# Patient Record
Sex: FEMALE | Race: WHITE
[De-identification: names, ages, dates, MRNs, and addresses within clinical notes are randomized per-mention and may not be internally consistent; named-entity substitution may affect disease eponyms.]

---

## 2017-01-04 ENCOUNTER — HOSPITAL ENCOUNTER (EMERGENCY)
Dept: HOSPITAL 10 - E/R | Age: 32
Discharge: HOME | End: 2017-01-04
Payer: COMMERCIAL

## 2017-01-04 VITALS
BODY MASS INDEX: 31.64 KG/M2 | HEIGHT: 63 IN | WEIGHT: 178.57 LBS | HEIGHT: 63 IN | WEIGHT: 178.57 LBS | BODY MASS INDEX: 31.64 KG/M2

## 2017-01-04 VITALS — DIASTOLIC BLOOD PRESSURE: 94 MMHG | SYSTOLIC BLOOD PRESSURE: 128 MMHG | RESPIRATION RATE: 18 BRPM | HEART RATE: 79 BPM

## 2017-01-04 DIAGNOSIS — R10.11: Primary | ICD-10-CM

## 2017-01-04 DIAGNOSIS — K80.20: ICD-10-CM

## 2017-01-04 DIAGNOSIS — R11.0: ICD-10-CM

## 2017-01-04 LAB
ADD UMIC: YES
ALBUMIN SERPL-MCNC: 4.1 G/DL (ref 3.3–4.9)
ALBUMIN/GLOB SERPL: 1.17 {RATIO}
ALP SERPL-CCNC: 71 IU/L (ref 42–121)
ALT SERPL-CCNC: 30 IU/L (ref 13–69)
ANION GAP SERPL CALC-SCNC: 19 MMOL/L (ref 8–16)
AST SERPL-CCNC: 23 IU/L (ref 15–46)
BASOPHILS # BLD AUTO: 0 10^3/UL (ref 0–0.1)
BASOPHILS NFR BLD: 0.4 % (ref 0–2)
BILIRUB DIRECT SERPL-MCNC: 0 MG/DL (ref 0–0.2)
BILIRUB SERPL-MCNC: 0.1 MG/DL (ref 0.2–1.3)
BUN SERPL-MCNC: 14 MG/DL (ref 7–20)
CALCIUM SERPL-MCNC: 9.5 MG/DL (ref 8.4–10.2)
CHLORIDE SERPL-SCNC: 102 MMOL/L (ref 97–110)
CO2 SERPL-SCNC: 27 MMOL/L (ref 21–31)
COLOR UR: (no result)
CONDITION: 1
CREAT SERPL-MCNC: 0.65 MG/DL (ref 0.44–1)
EOSINOPHIL # BLD: 0.1 10^3/UL (ref 0–0.5)
EOSINOPHIL NFR BLD: 1.1 % (ref 0–7)
ERYTHROCYTE [DISTWIDTH] IN BLOOD BY AUTOMATED COUNT: 12.3 % (ref 11.5–14.5)
GLOBULIN SER-MCNC: 3.5 G/DL (ref 1.3–3.2)
GLUCOSE SERPL-MCNC: 95 MG/DL (ref 70–220)
GLUCOSE UR STRIP-MCNC: NEGATIVE %
HCT VFR BLD CALC: 39.9 % (ref 37–47)
HGB BLD-MCNC: 13.6 G/DL (ref 12–16)
KETONES UR STRIP.AUTO-MCNC: (no result) MG/DL
LYMPHOCYTES # BLD AUTO: 2.1 10^3/UL (ref 0.8–2.9)
LYMPHOCYTES NFR BLD AUTO: 19.1 % (ref 15–51)
MCH RBC QN AUTO: 28.8 PG (ref 29–33)
MCHC RBC AUTO-ENTMCNC: 34 G/DL (ref 32–37)
MCV RBC AUTO: 84.7 FL (ref 82–101)
MONOCYTES # BLD: 0.7 10^3/UL (ref 0.3–0.9)
MONOCYTES NFR BLD: 6.6 % (ref 0–11)
NEUTROPHILS # BLD: 7.9 10^3/UL (ref 1.6–7.5)
NEUTROPHILS NFR BLD AUTO: 72.8 % (ref 39–77)
NITRITE UR QL STRIP.AUTO: NEGATIVE
NRBC # BLD MANUAL: 0 10^3/UL (ref 0–0)
NRBC BLD QL: 0 /100WBC (ref 0–0)
PLATELET # BLD: 291 10^3/UL (ref 140–440)
PMV BLD AUTO: 8.5 FL (ref 7.4–10.4)
POTASSIUM SERPL-SCNC: 4.2 MMOL/L (ref 3.5–5.1)
PROT SERPL-MCNC: 7.6 G/DL (ref 6.1–8.1)
RBC # BLD AUTO: 4.71 10^6/UL (ref 4.2–5.4)
RBC # UR AUTO: (no result) /UL
RBC #/AREA URNS HPF: (no result) /HPF
SODIUM SERPL-SCNC: 144 MMOL/L (ref 135–144)
SQUAMOUS #/AREA URNS HPF: (no result) /[HPF]
URINE BILIRUBIN (DIP): NEGATIVE
URINE TOTAL PROTEIN (DIP): NEGATIVE
UROBILINOGEN UR STRIP-ACNC: (no result) (ref 0.1–1)
WBC # BLD AUTO: 10.8 10^3/UL (ref 4.8–10.8)
WBC # BLD: 10.8 10^3/UL (ref 4.8–10.8)
WBC # UR STRIP: (no result) /UL

## 2017-01-04 PROCEDURE — 96374 THER/PROPH/DIAG INJ IV PUSH: CPT

## 2017-01-04 PROCEDURE — 80053 COMPREHEN METABOLIC PANEL: CPT

## 2017-01-04 PROCEDURE — 83690 ASSAY OF LIPASE: CPT

## 2017-01-04 PROCEDURE — 96375 TX/PRO/DX INJ NEW DRUG ADDON: CPT

## 2017-01-04 PROCEDURE — 81001 URINALYSIS AUTO W/SCOPE: CPT

## 2017-01-04 PROCEDURE — 85025 COMPLETE CBC W/AUTO DIFF WBC: CPT

## 2017-01-04 PROCEDURE — 76705 ECHO EXAM OF ABDOMEN: CPT

## 2017-01-04 PROCEDURE — 81003 URINALYSIS AUTO W/O SCOPE: CPT

## 2017-01-04 PROCEDURE — 36415 COLL VENOUS BLD VENIPUNCTURE: CPT

## 2017-01-04 NOTE — ERD
ER Documentation


Chief Complaint


Date/Time


DATE: 1/4/17 


TIME: 05:44


Chief Complaint


right side abd pain since 0130, same as previous GB pain. -n/v





HPI


This a very pleasant 31 year female comes right-sided abdominal pain since 131.

  She has history of gallstones and says is similar to previous pain.  Pain is 

mild to moderate intensity.  Mild nausea no vomiting.  Pain is mild to moderate 

in intensity with no exacerbating or relieving factors.





ROS


All systems reviewed and are negative except as per history of present illness.





Medications


Home Meds


Active Scripts


Hydrocodone/Acetaminophen (Norco  Tablet) 1 Each Tablet, 1 EACH PO Q6, #

20 TAB


   Prov:BILLIE VELEZ PA-C         12/8/16


Ibuprofen* (Motrin*) 800 Mg Tab, 800 MG PO Q6H Y for PAIN AND OR ELEVATED TEMP, 

#30 TAB


   Prov:BILLIE VELEZ PA-C         12/8/16


Reported Medications


[No Meds]   No Conflict Check


   6/1/16





Allergies


Allergies:  


Coded Allergies:  


     No Known Allergy (Unverified , 12/8/16)





PMhx/Soc


History of Surgery:  Yes (NOSE)


Anesthesia Reaction:  No


Hx Neurological Disorder:  No


Hx Respiratory Disorders:  No


Hx Cardiac Disorders:  No


Hx Psychiatric Problems:  No


Hx Miscellaneous Medical Probl:  No


Hx Alcohol Use:  Yes (OCCASIONAL)


Hx Substance Use:  No


Hx Tobacco Use:  No


Smoking Status:  Never smoker





Physical Exam


Vitals





Vital Signs








  Date Time  Temp Pulse Resp B/P Pulse Ox O2 Delivery O2 Flow Rate FiO2


 


1/4/17 03:58 97.6 64 18 136/87 99   








Physical Exam


Const:  []


Head:   Atraumatic 


Eyes:    Normal Conjunctiva


ENT:    Normal External Ears, Nose and Mouth.


Neck:               Full range of motion..~ No meningismus.


Resp:    Clear to auscultation bilaterally


Cardio:    Regular rate and rhythm, no murmurs


Abd:    Soft, non tender, non distended. Normal bowel sounds


Skin:    No petechiae or rashes


Back:    No midline or flank tenderness


Ext:    No cyanosis, or edema


Neur:    Awake and alert


Psych:    Normal Mood and Affect


Result Diagram:  


1/4/17 0450                                                                    

            1/4/17 0450





Results 24 hrs





 Laboratory Tests








Test


  1/4/17


04:50


 


Alanine Aminotransferase


(ALT/SGPT) 30IU/L 


 


 


Albumin 4.1g/dl 


 


Albumin/Globulin Ratio 1.17 


 


Alkaline Phosphatase 71IU/L 


 


Anion Gap 19 


 


Aspartate Amino Transf


(AST/SGOT) 23IU/L 


 


 


Basophils # 0.010^3/ul 


 


Basophils % 0.4% 


 


Blood Morphology Comment  


 


Blood Urea Nitrogen 14mg/dl 


 


Calcium Level 9.5mg/dl 


 


Carbon Dioxide Level 27mmol/L 


 


Chloride Level 102mmol/L 


 


Creatinine 0.65mg/dl 


 


Direct Bilirubin 0.00mg/dl 


 


Eosinophils # 0.110^3/ul 


 


Eosinophils % 1.1% 


 


Globulin 3.50g/dl 


 


Glucose Level 95mg/dl 


 


Hematocrit 39.9% 


 


Hemoglobin 13.6g/dl 


 


Indirect Bilirubin 0.1mg/dl 


 


Lipase 105U/L 


 


Lymphocytes # 2.110^3/ul 


 


Lymphocytes % 19.1% 


 


Mean Corpuscular Hemoglobin 28.8pg 


 


Mean Corpuscular Hemoglobin


Concent 34.0g/dl 


 


 


Mean Corpuscular Volume 84.7fl 


 


Mean Platelet Volume 8.5fl 


 


Monocytes # 0.710^3/ul 


 


Monocytes % 6.6% 


 


Neutrophils # 7.910^3/ul 


 


Neutrophils % 72.8% 


 


Nucleated Red Blood Cells # 0.010^3/ul 


 


Nucleated Red Blood Cells % 0.0/100WBC 


 


Platelet Count 52125^3/UL 


 


Potassium Level 4.2mmol/L 


 


Red Blood Count 4.7110^6/ul 


 


Red Cell Distribution Width 12.3% 


 


Sodium Level 144mmol/L 


 


Total Bilirubin 0.1mg/dl 


 


Total Protein 7.6g/dl 


 


White Blood Count 10.810^3/ul 








 Current Medications








 Medications


  (Trade)  Dose


 Ordered  Sig/Parker


 Route


 PRN Reason  Start Time


 Stop Time Status Last Admin


Dose Admin


 


 Sodium Chloride


  (NS)  1,000 ml @ 


 1,000 mls/hr  Q1H STAT


 IV


   1/4/17 04:44


 1/4/17 05:43 DC 1/4/17 04:56


 


 


 Morphine Sulfate


  (morphine)  4 mg  ONCE  STAT


 IV


   1/4/17 04:44


 1/4/17 04:45 DC 1/4/17 04:56


 


 


 Ondansetron HCl


  (Zofran Inj)  4 mg  ONCE  STAT


 IV


   1/4/17 04:44


 1/4/17 04:46 DC 1/4/17 04:56


 











Procedures/MDM


CBC:      no e/o of systemic infection or severe anemia


CMP:      no e/o severe acidosis, alkalosis, renal failure, diabetic 

ketoacidosis, liver disease


Lipase:               no e/o pancreatitis


PT/INR:   normal coagulation


Urine:      no e/o acute infection or hematuria





Ultrasound shows cholelithiasis.  Please see radiologist full dictation for 

report.





Medical decision-making: This 22 female as well as a gallstone pain.  At this 

point is clinically stable.  To be discharged home with tramadol and Zofran.  

His return 8 hours for serial abdominal exams which he agrees.





Departure


Diagnosis:  


 Primary Impression:  


 Abdominal pain


 Abdominal location:  right upper quadrant  Qualified Code:  R10.11 - Right 

upper quadrant abdominal pain


 Additional Impression:  


 Gallstones


Condition:  Serious











ZEB ODOM Jan 4, 2017 05:46

## 2017-01-04 NOTE — RADRPT
PROCEDURE:   US Abdomen (right upper quadrant). 

 

CLINICAL INDICATION:    Abdominal pain.

 

TECHNIQUE:   Multiple real-time longitudinal and transverse images of the right upper quadrant of th
e abdomen were acquired utilizing a curved array transducer. Images were reviewed on a high-resoluti
on PACS workstation. 

 

COMPARISON:   None 

 

FINDINGS:

 

The liver is normal in size and demonstrates normal  echogenicity.  No focal intrahepatic mass is id
entified.  The gallbladder contains stones.  There is no pericholecystic fluid or gallbladder wall t
hickening. No intra or extrahepatic biliary dilatation is seen.  The common bile duct measures 4.4 m
m in maximal dimension. The portal and hepatic veins are patent demonstrating normal directional annamaria
w. The visualized portions of the pancreas are unremarkable with obscuration of the tail of the panc
reas.  No free fluid is identified. 

 

The right kidney measures 12.6 cm in length.  There is normal echogenicity within the right kidney. 
 There is no perinephric fluid collection.  No hydronephrosis, mass, or calculus is seen.   

 

IMPRESSION:

Cholelithiasis. 

 

  

RPTAT: HH

_____________________________________________ 

.Gabriela Sanchez MD, MD           Date    Time 

Electronically viewed and signed by .Gabriela Sanchez MD, MD on 01/04/2017 05:40 

 

D:  01/04/2017 05:40  T:  01/04/2017 05:40

.G/

## 2017-01-13 ENCOUNTER — HOSPITAL ENCOUNTER (OUTPATIENT)
Dept: HOSPITAL 10 - LAB | Age: 32
Discharge: HOME | End: 2017-01-13
Attending: INTERNAL MEDICINE
Payer: COMMERCIAL

## 2017-01-13 DIAGNOSIS — K80.20: ICD-10-CM

## 2017-01-13 DIAGNOSIS — D64.9: Primary | ICD-10-CM

## 2017-01-13 LAB
ALBUMIN SERPL-MCNC: 4.2 G/DL (ref 3.3–4.9)
ALBUMIN/GLOB SERPL: 1.13 {RATIO}
ALP SERPL-CCNC: 75 IU/L (ref 42–121)
ALT SERPL-CCNC: 18 IU/L (ref 13–69)
ANION GAP SERPL CALC-SCNC: 19 MMOL/L (ref 8–16)
APTT BLD: 30.2 SEC (ref 25–35)
AST SERPL-CCNC: 21 IU/L (ref 15–46)
BASOPHILS # BLD AUTO: 0.1 10^3/UL (ref 0–0.1)
BASOPHILS NFR BLD: 0.6 % (ref 0–2)
BILIRUB DIRECT SERPL-MCNC: 0 MG/DL (ref 0–0.2)
BILIRUB SERPL-MCNC: 0.2 MG/DL (ref 0.2–1.3)
BUN SERPL-MCNC: 12 MG/DL (ref 7–20)
CALCIUM SERPL-MCNC: 9.5 MG/DL (ref 8.4–10.2)
CHLORIDE SERPL-SCNC: 103 MMOL/L (ref 97–110)
CO2 SERPL-SCNC: 28 MMOL/L (ref 21–31)
CONDITION: 1
CREAT SERPL-MCNC: 0.59 MG/DL (ref 0.44–1)
EOSINOPHIL # BLD: 0.2 10^3/UL (ref 0–0.5)
EOSINOPHIL NFR BLD: 1.9 % (ref 0–7)
ERYTHROCYTE [DISTWIDTH] IN BLOOD BY AUTOMATED COUNT: 12.6 % (ref 11.5–14.5)
GLOBULIN SER-MCNC: 3.7 G/DL (ref 1.3–3.2)
GLUCOSE SERPL-MCNC: 89 MG/DL (ref 70–220)
HCT VFR BLD CALC: 42.2 % (ref 37–47)
HGB BLD-MCNC: 14.6 G/DL (ref 12–16)
INR PPP: 0.91
LYMPHOCYTES # BLD AUTO: 2.6 10^3/UL (ref 0.8–2.9)
LYMPHOCYTES NFR BLD AUTO: 25.2 % (ref 15–51)
MCH RBC QN AUTO: 29.1 PG (ref 29–33)
MCHC RBC AUTO-ENTMCNC: 34.6 G/DL (ref 32–37)
MCV RBC AUTO: 84.1 FL (ref 82–101)
MONOCYTES # BLD: 0.7 10^3/UL (ref 0.3–0.9)
MONOCYTES NFR BLD: 6.6 % (ref 0–11)
NEUTROPHILS # BLD: 6.8 10^3/UL (ref 1.6–7.5)
NEUTROPHILS NFR BLD AUTO: 65.7 % (ref 39–77)
NRBC # BLD MANUAL: 0 10^3/UL (ref 0–0)
NRBC BLD QL: 0 /100WBC (ref 0–0)
PLATELET # BLD: 274 10^3/UL (ref 140–440)
PMV BLD AUTO: 8.2 FL (ref 7.4–10.4)
POTASSIUM SERPL-SCNC: 4.2 MMOL/L (ref 3.5–5.1)
PROT SERPL-MCNC: 7.9 G/DL (ref 6.1–8.1)
PROTHROMBIN TIME: 12.3 SEC (ref 12.2–14.2)
PT RATIO: 1
RBC # BLD AUTO: 5.02 10^6/UL (ref 4.2–5.4)
SODIUM SERPL-SCNC: 146 MMOL/L (ref 135–144)
WBC # BLD AUTO: 10.4 10^3/UL (ref 4.8–10.8)
WBC # BLD: 10.4 10^3/UL (ref 4.8–10.8)

## 2017-01-13 PROCEDURE — 85730 THROMBOPLASTIN TIME PARTIAL: CPT

## 2017-01-13 PROCEDURE — 85610 PROTHROMBIN TIME: CPT

## 2017-01-13 PROCEDURE — 80053 COMPREHEN METABOLIC PANEL: CPT

## 2017-01-13 PROCEDURE — 85025 COMPLETE CBC W/AUTO DIFF WBC: CPT

## 2017-01-16 ENCOUNTER — OFFICE (OUTPATIENT)
Dept: URBAN - METROPOLITAN AREA CLINIC 56 | Facility: CLINIC | Age: 32
End: 2017-01-16

## 2017-01-16 VITALS
HEART RATE: 79 BPM | WEIGHT: 175 LBS | HEIGHT: 64 IN | SYSTOLIC BLOOD PRESSURE: 113 MMHG | DIASTOLIC BLOOD PRESSURE: 67 MMHG

## 2017-01-16 DIAGNOSIS — R10.11 RUQ PAIN: ICD-10-CM

## 2017-01-16 PROCEDURE — 99212 OFFICE O/P EST SF 10 MIN: CPT

## 2017-01-16 NOTE — SERVICEHPINOTES
CURTIS PARKS   returns today for follow-up from last visit on   5/25/2016  . BRpt went to er in dec 2016 for abd pain  us showed  gallstonesBRwent to er again on jan 4th  17cmp normal

## 2017-04-26 ENCOUNTER — HOSPITAL ENCOUNTER (OUTPATIENT)
Dept: HOSPITAL 10 - LAB | Age: 32
Discharge: HOME | End: 2017-04-26
Attending: INTERNAL MEDICINE
Payer: COMMERCIAL

## 2017-04-26 DIAGNOSIS — E55.9: Primary | ICD-10-CM

## 2017-04-26 DIAGNOSIS — R73.03: ICD-10-CM

## 2017-04-26 DIAGNOSIS — E03.9: ICD-10-CM

## 2017-04-26 LAB
ADD SCAN DIFF: NO
ALBUMIN SERPL-MCNC: 4.2 G/DL (ref 3.3–4.9)
ALBUMIN/GLOB SERPL: 1.2 {RATIO}
ALP SERPL-CCNC: 85 IU/L (ref 42–121)
ALT SERPL-CCNC: 24 IU/L (ref 13–69)
ANION GAP SERPL CALC-SCNC: 14 MMOL/L (ref 8–16)
AST SERPL-CCNC: 21 IU/L (ref 15–46)
BASOPHILS # BLD AUTO: 0.1 10^3/UL (ref 0–0.1)
BASOPHILS NFR BLD: 0.9 % (ref 0–2)
BILIRUB DIRECT SERPL-MCNC: 0 MG/DL (ref 0–0.2)
BILIRUB SERPL-MCNC: 0.1 MG/DL (ref 0.2–1.3)
BUN SERPL-MCNC: 16 MG/DL (ref 7–20)
CALCIUM SERPL-MCNC: 9.2 MG/DL (ref 8.4–10.2)
CHLORIDE SERPL-SCNC: 107 MMOL/L (ref 97–110)
CHOLEST SERPL-MCNC: 157 MG/DL (ref 100–200)
CHOLEST/HDLC SERPL: 3.2 RATIO
CO2 SERPL-SCNC: 25 MMOL/L (ref 21–31)
CREAT SERPL-MCNC: 0.66 MG/DL (ref 0.44–1)
EOSINOPHIL # BLD: 0.1 10^3/UL (ref 0–0.5)
EOSINOPHIL NFR BLD: 1.2 % (ref 0–7)
ERYTHROCYTE [DISTWIDTH] IN BLOOD BY AUTOMATED COUNT: 12 % (ref 11.5–14.5)
GLOBULIN SER-MCNC: 3.5 G/DL (ref 1.3–3.2)
GLUCOSE SERPL-MCNC: 87 MG/DL (ref 70–220)
HCT VFR BLD CALC: 42.2 % (ref 37–47)
HDLC SERPL-MCNC: 49 MG/DL (ref 34–82)
HGB BLD-MCNC: 13.6 G/DL (ref 12–16)
LYMPHOCYTES # BLD AUTO: 2.5 10^3/UL (ref 0.8–2.9)
LYMPHOCYTES NFR BLD AUTO: 27.9 % (ref 15–51)
MCH RBC QN AUTO: 28.2 PG (ref 29–33)
MCHC RBC AUTO-ENTMCNC: 32.2 G/DL (ref 32–37)
MCV RBC AUTO: 87.6 FL (ref 82–101)
MONOCYTES # BLD: 0.6 10^3/UL (ref 0.3–0.9)
MONOCYTES NFR BLD: 6.2 % (ref 0–11)
NEUTROPHILS # BLD: 5.6 10^3/UL (ref 1.6–7.5)
NEUTROPHILS NFR BLD AUTO: 63.3 % (ref 39–77)
NRBC # BLD MANUAL: 0 10^3/UL (ref 0–0)
NRBC BLD QL: 0 /100WBC (ref 0–0)
PLATELET # BLD: 309 10^3/UL (ref 140–415)
PMV BLD AUTO: 9.9 FL (ref 7.4–10.4)
POTASSIUM SERPL-SCNC: 4.2 MMOL/L (ref 3.5–5.1)
PROT SERPL-MCNC: 7.7 G/DL (ref 6.1–8.1)
RBC # BLD AUTO: 4.82 10^6/UL (ref 4.2–5.4)
SODIUM SERPL-SCNC: 142 MMOL/L (ref 135–144)
TRIGL SERPL-MCNC: 62 MG/DL (ref 0–149)
TSH SERPL-ACNC: 0.73 MIU/L (ref 0.47–4.68)
WBC # BLD AUTO: 8.8 10^3/UL (ref 4.8–10.8)

## 2017-04-26 PROCEDURE — 84443 ASSAY THYROID STIM HORMONE: CPT

## 2017-04-26 PROCEDURE — 80053 COMPREHEN METABOLIC PANEL: CPT

## 2017-04-26 PROCEDURE — 83036 HEMOGLOBIN GLYCOSYLATED A1C: CPT

## 2017-04-26 PROCEDURE — 82306 VITAMIN D 25 HYDROXY: CPT

## 2017-04-26 PROCEDURE — 85025 COMPLETE CBC W/AUTO DIFF WBC: CPT

## 2017-04-26 PROCEDURE — 84436 ASSAY OF TOTAL THYROXINE: CPT

## 2017-04-26 PROCEDURE — 80061 LIPID PANEL: CPT

## 2017-05-09 ENCOUNTER — HOSPITAL ENCOUNTER (EMERGENCY)
Dept: HOSPITAL 10 - FTE | Age: 32
Discharge: HOME | End: 2017-05-09
Payer: COMMERCIAL

## 2017-05-09 VITALS — WEIGHT: 158.51 LBS | BODY MASS INDEX: 36.68 KG/M2 | HEIGHT: 55 IN

## 2017-05-09 DIAGNOSIS — M54.2: ICD-10-CM

## 2017-05-09 DIAGNOSIS — M79.602: ICD-10-CM

## 2017-05-09 DIAGNOSIS — M54.9: Primary | ICD-10-CM

## 2017-05-09 DIAGNOSIS — Z04.1: ICD-10-CM

## 2017-05-09 PROCEDURE — 72100 X-RAY EXAM L-S SPINE 2/3 VWS: CPT

## 2017-05-09 PROCEDURE — 73030 X-RAY EXAM OF SHOULDER: CPT

## 2017-05-09 PROCEDURE — 73060 X-RAY EXAM OF HUMERUS: CPT

## 2017-05-09 PROCEDURE — 72040 X-RAY EXAM NECK SPINE 2-3 VW: CPT
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
## 2017-05-09 NOTE — RADRPT
PROCEDURE:   XR Cervical Spine. 

 

CLINICAL INDICATION:   Neck pain 

 

TECHNIQUE:   AP, lateral and odontoid views of the cervical spine were performed. The images were re
viewed on a PACS workstation. 

 

COMPARISON:   None. 

 

FINDINGS:

Cervical spine is visualized to the level of C7-T1.  No displaced fracture is identified.  The align
ment of the cervical spine is anatomic.  Vertebral body heights and disk spaces are well maintained.
  Facets are normally aligned. The lateral masses are symmetric. The prevertebral soft tissues are u
nremarkable.

 

IMPRESSION:

 

1.  No acute fracture dislocation.

2.  No significant degenerate change.

 

 

RPTAT: HH

_____________________________________________ 

.Vik Monique MD, MD           Date    Time 

Electronically viewed and signed by .Vik Monique MD, MD on 05/09/2017 11:29 

 

D:  05/09/2017 11:29  T:  05/09/2017 11:29

.W/

## 2017-05-09 NOTE — RADRPT
PROCEDURE:   XR left humerus 

 

CLINICAL INDICATION:  Left upper extremity pain

 

TECHNIQUE:   2 images of the left humerus 

 

COMPARISON:   None available 

 

FINDINGS:

There is no radiographic evidence of acute osseous abnormality of the left humerus.  Visualized soft
 tissues are grossly unremarkable. 

 

IMPRESSION:

Normal radiographs of the left humerus.

 

RPTAT: UU

_____________________________________________ 

.Jorden Alexander MD, MD           Date    Time 

Electronically viewed and signed by .Jorden Alexander MD, MD on 05/09/2017 11:38 

 

D:  05/09/2017 11:38  T:  05/09/2017 11:38

.K/

## 2017-05-09 NOTE — RADRPT
PROCEDURE:   Shoulder x-ray

 

CLINICAL INDICATION:   Pain 

 

TECHNIQUE:   Left shoulder 3 views 

 

COMPARISON:   None 

 

FINDINGS:

3 views of the left shoulder demonstrate no displaced fracture.  The humeral head articulates anatom
ically with the glenoid fossa.  The acromioclavicular articulation is within normal limits.  Bones a
re normally mineralized.  Soft tissues are unremarkable. 

 

IMPRESSION:

 

No acute fracture dislocation 

No significant degenerate change

 

RPTAT: HH

_____________________________________________ 

.Vik Monique MD, MD           Date    Time 

Electronically viewed and signed by .Vik Monique MD, MD on 05/09/2017 11:28 

 

D:  05/09/2017 11:28  T:  05/09/2017 11:28

.W/

## 2017-05-09 NOTE — RADRPT
PROCEDURE:   XR Lumbar Spine. 

 

CLINICAL INDICATION:   Low back pain

 

TECHNIQUE:   3 images of the lumbar spine were obtained. 

 

COMPARISON:   No prior studies are available for comparison. 

 

FINDINGS:

 

There is transitional anatomy with rudimentary rib at T12 on the right and partial sacralization of 
L5 on the right.

Vertebral body height and alignment is preserved.

There is no radiographic evidence of acute fracture or subluxation.

Intervertebral disk spaces are preserved.

Paraspinal soft tissues are grossly unremarkable.

Limited assessment of the sacroiliac joints is grossly unremarkable.

 

 

IMPRESSION:

1. No radiographic evidence of acute osseous abnormality.

2.  Transitional anatomy with rudimentary rib at T12 on the right and partial sacralization of L5 on
 the left.

 

RPTAT: UU

_____________________________________________ 

.Jorden Alexander MD, MD           Date    Time 

Electronically viewed and signed by .Jorden Alexander MD, MD on 05/09/2017 11:40 

 

D:  05/09/2017 11:40  T:  05/09/2017 11:40

.K/

## 2017-05-09 NOTE — RADRPT
PROCEDURE:   XR Wrist. 

 

CLINICAL INDICATION:   Left wrist pain 

 

TECHNIQUE:   4 views of the left wrist were performed. 

 

COMPARISON:   No prior studies are available for comparison. 

 

FINDINGS:

 

There is no acute fracture or dislocation.

Alignment is normal.

Joint spaces are preserved.  

Visualized soft tissues are grossly unremarkable.

 

IMPRESSION:

 

1. No radiographic evidence of acute osseous abnormality of the left wrist.

 

RPTAT: UU

_____________________________________________ 

.Jorden Alexander MD, MD           Date    Time 

Electronically viewed and signed by .Jorden Alexander MD, MD on 05/09/2017 11:38 

 

D:  05/09/2017 11:38  T:  05/09/2017 11:38

.K/

## 2017-09-18 ENCOUNTER — HOSPITAL ENCOUNTER (OUTPATIENT)
Dept: HOSPITAL 10 - LAB | Age: 32
Discharge: HOME | End: 2017-09-18
Attending: INTERNAL MEDICINE
Payer: COMMERCIAL

## 2017-09-18 DIAGNOSIS — K81.0: Primary | ICD-10-CM

## 2017-09-18 LAB
ALBUMIN SERPL-MCNC: 4.3 G/DL (ref 3.3–4.9)
ALBUMIN/GLOB SERPL: 1.22 {RATIO}
ALP SERPL-CCNC: 88 IU/L (ref 42–121)
ALT SERPL-CCNC: 38 IU/L (ref 13–69)
ANION GAP SERPL CALC-SCNC: 14 MMOL/L (ref 8–16)
APTT BLD: 30.5 SEC (ref 25–35)
AST SERPL-CCNC: 21 IU/L (ref 15–46)
BASOPHILS # BLD AUTO: 0.1 10^3/UL (ref 0–0.1)
BASOPHILS NFR BLD: 1 % (ref 0–2)
BILIRUB DIRECT SERPL-MCNC: 0 MG/DL (ref 0–0.2)
BILIRUB SERPL-MCNC: 0.1 MG/DL (ref 0.2–1.3)
BUN SERPL-MCNC: 10 MG/DL (ref 7–20)
CALCIUM SERPL-MCNC: 9.5 MG/DL (ref 8.4–10.2)
CHLORIDE SERPL-SCNC: 105 MMOL/L (ref 97–110)
CO2 SERPL-SCNC: 28 MMOL/L (ref 21–31)
CREAT SERPL-MCNC: 0.58 MG/DL (ref 0.44–1)
EOSINOPHIL # BLD: 0.1 10^3/UL (ref 0–0.5)
EOSINOPHIL NFR BLD: 1.4 % (ref 0–7)
ERYTHROCYTE [DISTWIDTH] IN BLOOD BY AUTOMATED COUNT: 12.1 % (ref 11.5–14.5)
GLOBULIN SER-MCNC: 3.5 G/DL (ref 1.3–3.2)
GLUCOSE SERPL-MCNC: 94 MG/DL (ref 70–220)
HCT VFR BLD CALC: 42.8 % (ref 37–47)
HGB BLD-MCNC: 14 G/DL (ref 12–16)
INR PPP: 0.85
LYMPHOCYTES # BLD AUTO: 3 10^3/UL (ref 0.8–2.9)
LYMPHOCYTES NFR BLD AUTO: 31.1 % (ref 15–51)
MCH RBC QN AUTO: 28.3 PG (ref 29–33)
MCHC RBC AUTO-ENTMCNC: 32.7 G/DL (ref 32–37)
MCV RBC AUTO: 86.6 FL (ref 82–101)
MONOCYTES # BLD: 0.6 10^3/UL (ref 0.3–0.9)
MONOCYTES NFR BLD: 6.2 % (ref 0–11)
NEUTROPHILS # BLD: 5.8 10^3/UL (ref 1.6–7.5)
NEUTROPHILS NFR BLD AUTO: 60 % (ref 39–77)
NRBC # BLD MANUAL: 0 10^3/UL (ref 0–0)
NRBC BLD AUTO-RTO: 0 /100WBC (ref 0–0)
PLATELET # BLD: 290 10^3/UL (ref 140–415)
PMV BLD AUTO: 10.5 FL (ref 7.4–10.4)
POTASSIUM SERPL-SCNC: 4.1 MMOL/L (ref 3.5–5.1)
PROT SERPL-MCNC: 7.8 G/DL (ref 6.1–8.1)
PROTHROMBIN TIME: 11.6 SEC (ref 12.2–14.2)
PT RATIO: 0.9
RBC # BLD AUTO: 4.94 10^6/UL (ref 4.2–5.4)
SODIUM SERPL-SCNC: 143 MMOL/L (ref 135–144)
WBC # BLD AUTO: 9.6 10^3/UL (ref 4.8–10.8)

## 2017-09-18 PROCEDURE — 85025 COMPLETE CBC W/AUTO DIFF WBC: CPT

## 2017-09-18 PROCEDURE — 80053 COMPREHEN METABOLIC PANEL: CPT

## 2017-09-18 PROCEDURE — 87040 BLOOD CULTURE FOR BACTERIA: CPT

## 2017-09-18 PROCEDURE — 85730 THROMBOPLASTIN TIME PARTIAL: CPT

## 2017-09-18 PROCEDURE — 85610 PROTHROMBIN TIME: CPT

## 2017-11-29 ENCOUNTER — HOSPITAL ENCOUNTER (EMERGENCY)
Dept: HOSPITAL 10 - FTE | Age: 32
Discharge: HOME | End: 2017-11-29
Payer: COMMERCIAL

## 2017-11-29 VITALS
BODY MASS INDEX: 32.46 KG/M2 | WEIGHT: 176.37 LBS | WEIGHT: 176.37 LBS | HEIGHT: 62 IN | HEIGHT: 62 IN | BODY MASS INDEX: 32.46 KG/M2
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
## 2017-11-29 DIAGNOSIS — S39.012A: Primary | ICD-10-CM
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
## 2017-11-29 LAB
ADD UMIC: NO
COLOR UR: YELLOW
GLUCOSE UR STRIP-MCNC: NEGATIVE MG/DL
KETONES UR STRIP.AUTO-MCNC: NEGATIVE MG/DL
NITRITE UR QL STRIP.AUTO: NEGATIVE MG/DL
RBC # UR AUTO: NEGATIVE MG/DL
UR ASCORBIC ACID: NEGATIVE MG/DL
UR BILIRUBIN (DIP): NEGATIVE MG/DL
UR CLARITY: CLEAR
UR PH (DIP): 6 (ref 5–9)
UR SPECIFIC GRAVITY (DIP): 1.01 (ref 1–1.03)
UR TOTAL PROTEIN (DIP): NEGATIVE MG/DL
UROBILINOGEN UR STRIP-ACNC: NEGATIVE MG/DL
WBC # UR STRIP: NEGATIVE LEU/UL

## 2017-11-29 PROCEDURE — 96372 THER/PROPH/DIAG INJ SC/IM: CPT

## 2017-11-29 PROCEDURE — 99284 EMERGENCY DEPT VISIT MOD MDM: CPT

## 2017-11-29 PROCEDURE — 81003 URINALYSIS AUTO W/O SCOPE: CPT

## 2017-11-29 NOTE — ERD
ER Documentation


Chief Complaint


Chief Complaint


BACK PAIN X 1 WEEK





HPI


32 year old female presents to the emergency department complaining of left 

moderate lumbar back pain for one week. She denies trauma, painful urination, 

hematuria, fevers.





ROS


All systems reviewed and are negative except as per history of present illness.





Medications


Home Meds


Active Scripts


Cyclobenzaprine Hcl* (Cyclobenzaprine Hcl*) 10 Mg Tablet, 10 MG PO TID, #30 TAB


   Prov:LINO CHAPMAN PA-C         11/29/17


Ibuprofen* (Motrin*) 600 Mg Tab, 600 MG PO Q6H Y for PAIN AND OR ELEVATED TEMP, 

#30 TAB


   Prov:LINO CHAPMAN PA-C         11/29/17


Cyclobenzaprine Hcl* (Cyclobenzaprine Hcl*) 10 Mg Tablet, 10 MG PO QHS, #7 TAB


   Prov:SOPHY MURRAY PA-C         5/9/17


Naproxen* (Naprosyn*) 500 Mg Tablet, 500 MG PO BID Y for PAIN AND/OR 

INFLAMMATION, #30 TAB


   Prov:SOPHY MURRAY PA-C         5/9/17


Tramadol HCl (Tramadol HCl) 50 Mg Tablet, 50 MG PO Q4 Y for PAIN, #20 TAB


   Prov:SOPHY MURRAY PA-C         5/9/17


Neomycin Hunt/Bacitrac Zn/Poly (Triple Antibiotic Ointment) 1 Each Oint.pack, 1 

EACH TP BID for 7 Days


   Prov:SOPHY MURRAY PA-C         5/9/17


Ondansetron (Ondansetron Odt) 4 Mg Tab.rapdis, 4 MG PO Q6H Y for NAUSEA AND/OR 

VOMITING, #10 TAB


   Prov:ZEB ODOM.         1/4/17


Hydrocodone/Acetaminophen (Norco  Tablet) 1 Each Tablet, 1 TAB PO Q6H Y 

for PAIN, #20 TAB


   Prov:ZEB ODOM S.         1/4/17


Hydrocodone/Acetaminophen (Norco  Tablet) 1 Each Tablet, 1 EACH PO Q6, #

20 TAB


   Prov:BILLIE VELEZ PA-C         12/8/16


Ibuprofen* (Motrin*) 800 Mg Tab, 800 MG PO Q6H Y for PAIN AND OR ELEVATED TEMP, 

#30 TAB


   Prov:BILLIE VELEZ PA-C         12/8/16


Reported Medications


[No Meds]   No Conflict Check


   6/1/16





Allergies


Allergies:  


Coded Allergies:  


     No Known Allergy (Unverified , 12/8/16)





PMhx/Soc


History of Surgery:  Yes (NOSE)


Anesthesia Reaction:  No


Hx Neurological Disorder:  No


Hx Respiratory Disorders:  No


Hx Cardiac Disorders:  No


Hx Psychiatric Problems:  No


Hx Miscellaneous Medical Probl:  No


Hx Alcohol Use:  Yes (OCCASIONAL)


Hx Substance Use:  No


Hx Tobacco Use:  No


Smoking Status:  Never smoker





Physical Exam


Vitals





Vital Signs








  Date Time  Temp Pulse Resp B/P Pulse Ox O2 Delivery O2 Flow Rate FiO2


 


11/29/17 14:05 98.1 78 18 130/78 99   








Physical Exam


Const:     WDWN, no acute distress


Head:   Atraumatic 


Eyes:    Normal Conjunctiva


ENT:    Normal External Ears, Nose and Mouth.


Neck:               Full range of motion..~ No meningismus.


Resp:    Clear to auscultation bilaterally


Cardio:    Regular rate and rhythm, no murmurs


Abd:    Soft, non tender, non distended. Normal bowel sounds


Skin:    No petechiae or rashes


Back:    No midline or flank tenderness


      TTP on the left lumbar region


Ext:    No cyanosis, or edema


Neur:    Awake and alert


Psych:    Normal Mood and Affect


Results 24 hrs





 Laboratory Tests








Test


  11/29/17


14:35


 


Urine Color YELLOW 


 


Urine Clarity CLEAR 


 


Urine pH 6.0 


 


Urine Specific Gravity 1.008 


 


Urine Ketones NEGATIVEmg/dL 


 


Urine Nitrite NEGATIVEmg/dL 


 


Urine Bilirubin NEGATIVEmg/dL 


 


Urine Urobilinogen NEGATIVEmg/dL 


 


Urine Leukocyte Esterase NEGATIVELeu/ul 


 


Urine Hemoglobin NEGATIVEmg/dL 


 


Urine Glucose NEGATIVEmg/dL 


 


Urine Total Protein NEGATIVEmg/dl 








 Current Medications








 Medications


  (Trade)  Dose


 Ordered  Sig/Parker


 Route


 PRN Reason  Start Time


 Stop Time Status Last Admin


Dose Admin


 


 Ketorolac


 Tromethamine


  (Toradol)  60 mg  ONCE  STAT


 IM


   11/29/17 14:19


 11/29/17 14:20 DC 11/29/17 15:13


 











Procedures/MDM


32 year old female presenting to the emergency department complaining of left 

lumbar back pain, likely due to a strain.  Evidence of cauda equina or 

fracture.  She did not have any evidence of obstructed or septic 

nephrolithiasis or UTI, pyelonephritis. Patient was given toradol, I have 

reassesed her and she feels better.  Discharged home with prescription for 

Flexeril and ibuprofen





Departure


Diagnosis:  


 Primary Impression:  


 Lumbar strain


Condition:  Stable


Patient Instructions:  Self-Care for Low Back Pain, Back Exercises, Lumbar, 

Back Sprain/Strain





Additional Instructions:  


FOLLOW UP WITH YOUR PRIMARY CARE PHYSICIAN TOMORROW.Return to this facility if 

you are not improving as expected.








Take all medicines as directed.





Return to this facility if you are not improving as expected.





You have been given a medicine which may cause drowsiness.DO NOT DRIVE OR 

OPERATE DANGEROUS MACHINERY while taking this medicine!











LINO CHAPMAN PA-C Nov 29, 2017 15:46

## 2017-12-28 ENCOUNTER — HOSPITAL ENCOUNTER (OUTPATIENT)
Dept: HOSPITAL 10 - LAB | Age: 32
Discharge: HOME | End: 2017-12-28
Attending: INTERNAL MEDICINE
Payer: COMMERCIAL

## 2017-12-28 DIAGNOSIS — N39.0: Primary | ICD-10-CM

## 2017-12-28 LAB
ADD UMIC: NO
BACTERIA #/AREA URNS HPF: (no result) /HPF
COLOR UR: YELLOW
GLUCOSE UR STRIP-MCNC: NEGATIVE MG/DL
KETONES UR STRIP.AUTO-MCNC: NEGATIVE MG/DL
MUCOUS THREADS #/AREA URNS HPF: (no result) /HPF
NITRITE UR QL STRIP.AUTO: NEGATIVE MG/DL
RBC # UR AUTO: NEGATIVE MG/DL
SQUAMOUS #/AREA URNS HPF: (no result) /HPF
UR ASCORBIC ACID: NEGATIVE MG/DL
UR BILIRUBIN (DIP): NEGATIVE MG/DL
UR CLARITY: (no result)
UR PH (DIP): 6 (ref 5–9)
UR RBC: 3 /HPF (ref 0–5)
UR SPECIFIC GRAVITY (DIP): 1.02 (ref 1–1.03)
UR TOTAL PROTEIN (DIP): NEGATIVE MG/DL
UROBILINOGEN UR STRIP-ACNC: NEGATIVE MG/DL
WBC # UR STRIP: NEGATIVE LEU/UL

## 2017-12-28 PROCEDURE — 81001 URINALYSIS AUTO W/SCOPE: CPT

## 2017-12-28 PROCEDURE — 87086 URINE CULTURE/COLONY COUNT: CPT

## 2017-12-28 PROCEDURE — 81003 URINALYSIS AUTO W/O SCOPE: CPT

## 2018-06-06 ENCOUNTER — HOSPITAL ENCOUNTER (OUTPATIENT)
Dept: HOSPITAL 91 - LAB | Age: 33
Discharge: HOME | End: 2018-06-06
Payer: COMMERCIAL

## 2018-06-06 ENCOUNTER — HOSPITAL ENCOUNTER (OUTPATIENT)
Age: 33
Discharge: HOME | End: 2018-06-06

## 2018-06-06 DIAGNOSIS — K81.9: Primary | ICD-10-CM

## 2018-06-06 LAB
ADD MAN DIFF?: NO
ALANINE AMINOTRANSFERASE: 33 IU/L (ref 13–69)
ALBUMIN/GLOBULIN RATIO: 1.33
ALBUMIN: 4.4 G/DL (ref 3.3–4.9)
ALKALINE PHOSPHATASE: 88 IU/L (ref 42–121)
ANION GAP: 17 (ref 8–16)
ASPARTATE AMINO TRANSFERASE: 20 IU/L (ref 15–46)
BASOPHIL #: 0.1 10^3/UL (ref 0–0.1)
BASOPHILS %: 0.9 % (ref 0–2)
BILIRUBIN,DIRECT: 0 MG/DL (ref 0–0.2)
BILIRUBIN,TOTAL: 0.2 MG/DL (ref 0.2–1.3)
BLOOD UREA NITROGEN: 10 MG/DL (ref 7–20)
CALCIUM: 9.3 MG/DL (ref 8.4–10.2)
CARBON DIOXIDE: 31 MMOL/L (ref 21–31)
CHLORIDE: 103 MMOL/L (ref 97–110)
CREATININE: 0.6 MG/DL (ref 0.44–1)
EOSINOPHILS #: 0.1 10^3/UL (ref 0–0.5)
EOSINOPHILS %: 1.1 % (ref 0–7)
GLOBULIN: 3.3 G/DL (ref 1.3–3.2)
GLUCOSE: 117 MG/DL (ref 70–220)
HEMATOCRIT: 42.4 % (ref 37–47)
HEMOGLOBIN: 14.2 G/DL (ref 12–16)
LYMPHOCYTES #: 2.6 10^3/UL (ref 0.8–2.9)
LYMPHOCYTES %: 22.7 % (ref 15–51)
MEAN CORPUSCULAR HEMOGLOBIN: 30 PG (ref 29–33)
MEAN CORPUSCULAR HGB CONC: 33.5 G/DL (ref 32–37)
MEAN CORPUSCULAR VOLUME: 89.5 FL (ref 82–101)
MEAN PLATELET VOLUME: 10.1 FL (ref 7.4–10.4)
MONOCYTE #: 0.7 10^3/UL (ref 0.3–0.9)
MONOCYTES %: 6.1 % (ref 0–11)
NEUTROPHIL #: 8 10^3/UL (ref 1.6–7.5)
NEUTROPHILS %: 68.8 % (ref 39–77)
NUCLEATED RED BLOOD CELLS #: 0 10^3/UL (ref 0–0)
NUCLEATED RED BLOOD CELLS%: 0 /100WBC (ref 0–0)
PLATELET COUNT: 313 10^3/UL (ref 140–415)
POTASSIUM: 3.7 MMOL/L (ref 3.5–5.1)
RED BLOOD COUNT: 4.74 10^6/UL (ref 4.2–5.4)
RED CELL DISTRIBUTION WIDTH: 11.7 % (ref 11.5–14.5)
SODIUM: 147 MMOL/L (ref 135–144)
TOTAL PROTEIN: 7.7 G/DL (ref 6.1–8.1)
WHITE BLOOD COUNT: 11.6 10^3/UL (ref 4.8–10.8)

## 2018-06-06 PROCEDURE — 85025 COMPLETE CBC W/AUTO DIFF WBC: CPT

## 2018-06-06 PROCEDURE — 80053 COMPREHEN METABOLIC PANEL: CPT

## 2018-07-02 ENCOUNTER — HOSPITAL ENCOUNTER (INPATIENT)
Dept: HOSPITAL 91 - MS1 | Age: 33
LOS: 5 days | Discharge: HOME | DRG: 418 | End: 2018-07-07
Payer: COMMERCIAL

## 2018-07-02 ENCOUNTER — HOSPITAL ENCOUNTER (INPATIENT)
Age: 33
LOS: 5 days | Discharge: HOME | DRG: 418 | End: 2018-07-07

## 2018-07-02 DIAGNOSIS — R19.8: ICD-10-CM

## 2018-07-02 DIAGNOSIS — K80.00: Primary | ICD-10-CM

## 2018-07-02 DIAGNOSIS — R73.03: ICD-10-CM

## 2018-07-02 DIAGNOSIS — E28.2: ICD-10-CM

## 2018-07-02 DIAGNOSIS — K29.50: ICD-10-CM

## 2018-07-02 DIAGNOSIS — E87.0: ICD-10-CM

## 2018-07-02 LAB
ADD MAN DIFF?: NO
ALANINE AMINOTRANSFERASE: 60 IU/L (ref 13–69)
ALBUMIN/GLOBULIN RATIO: 1.29
ALBUMIN: 4.4 G/DL (ref 3.3–4.9)
ALKALINE PHOSPHATASE: 113 IU/L (ref 42–121)
ANION GAP: 19 (ref 8–16)
ASPARTATE AMINO TRANSFERASE: 31 IU/L (ref 15–46)
BASOPHIL #: 0.1 10^3/UL (ref 0–0.1)
BASOPHILS %: 0.8 % (ref 0–2)
BILIRUBIN,DIRECT: 0 MG/DL (ref 0–0.2)
BILIRUBIN,TOTAL: 0.3 MG/DL (ref 0.2–1.3)
BLOOD UREA NITROGEN: 5 MG/DL (ref 7–20)
CALCIUM: 9.7 MG/DL (ref 8.4–10.2)
CARBON DIOXIDE: 29 MMOL/L (ref 21–31)
CHLORIDE: 101 MMOL/L (ref 97–110)
CREATININE: 0.53 MG/DL (ref 0.44–1)
EOSINOPHILS #: 0.1 10^3/UL (ref 0–0.5)
EOSINOPHILS %: 0.8 % (ref 0–7)
GLOBULIN: 3.4 G/DL (ref 1.3–3.2)
GLUCOSE: 98 MG/DL (ref 70–220)
HEMATOCRIT: 40.3 % (ref 37–47)
HEMOGLOBIN: 13.7 G/DL (ref 12–16)
INR: 0.96
LIPASE: 71 U/L (ref 23–300)
LYMPHOCYTES #: 2.4 10^3/UL (ref 0.8–2.9)
LYMPHOCYTES %: 23.8 % (ref 15–51)
MEAN CORPUSCULAR HEMOGLOBIN: 29.3 PG (ref 29–33)
MEAN CORPUSCULAR HGB CONC: 34 G/DL (ref 32–37)
MEAN CORPUSCULAR VOLUME: 86.1 FL (ref 82–101)
MEAN PLATELET VOLUME: 9.8 FL (ref 7.4–10.4)
MONOCYTE #: 0.6 10^3/UL (ref 0.3–0.9)
MONOCYTES %: 6.4 % (ref 0–11)
NEUTROPHIL #: 6.7 10^3/UL (ref 1.6–7.5)
NEUTROPHILS %: 67.7 % (ref 39–77)
NUCLEATED RED BLOOD CELLS #: 0 10^3/UL (ref 0–0)
NUCLEATED RED BLOOD CELLS%: 0 /100WBC (ref 0–0)
PARTIAL THROMBOPLASTIN TIME: 34 SEC (ref 25–35)
PLATELET COUNT: 361 10^3/UL (ref 140–415)
POTASSIUM: 3.8 MMOL/L (ref 3.5–5.1)
PROTIME: 12.9 SEC (ref 11.9–14.9)
PT RATIO: 1
RED BLOOD COUNT: 4.68 10^6/UL (ref 4.2–5.4)
RED CELL DISTRIBUTION WIDTH: 11.3 % (ref 11.5–14.5)
SODIUM: 145 MMOL/L (ref 135–144)
TOTAL PROTEIN: 7.8 G/DL (ref 6.1–8.1)
WHITE BLOOD COUNT: 9.9 10^3/UL (ref 4.8–10.8)

## 2018-07-02 PROCEDURE — 83036 HEMOGLOBIN GLYCOSYLATED A1C: CPT

## 2018-07-02 PROCEDURE — 99285 EMERGENCY DEPT VISIT HI MDM: CPT

## 2018-07-02 PROCEDURE — 76705 ECHO EXAM OF ABDOMEN: CPT

## 2018-07-02 PROCEDURE — 85025 COMPLETE CBC W/AUTO DIFF WBC: CPT

## 2018-07-02 PROCEDURE — 96374 THER/PROPH/DIAG INJ IV PUSH: CPT

## 2018-07-02 PROCEDURE — 83690 ASSAY OF LIPASE: CPT

## 2018-07-02 PROCEDURE — 80053 COMPREHEN METABOLIC PANEL: CPT

## 2018-07-02 PROCEDURE — 85730 THROMBOPLASTIN TIME PARTIAL: CPT

## 2018-07-02 PROCEDURE — 81025 URINE PREGNANCY TEST: CPT

## 2018-07-02 PROCEDURE — 36415 COLL VENOUS BLD VENIPUNCTURE: CPT

## 2018-07-02 PROCEDURE — 96375 TX/PRO/DX INJ NEW DRUG ADDON: CPT

## 2018-07-02 PROCEDURE — 85610 PROTHROMBIN TIME: CPT

## 2018-07-02 RX ADMIN — ONDANSETRON HYDROCHLORIDE 1 MG: 2 INJECTION, SOLUTION INTRAMUSCULAR; INTRAVENOUS at 16:34

## 2018-07-02 RX ADMIN — ONDANSETRON HYDROCHLORIDE 1 MG: 2 INJECTION, SOLUTION INTRAMUSCULAR; INTRAVENOUS at 19:35

## 2018-07-02 RX ADMIN — HYDROMORPHONE HYDROCHLORIDE 1 MG: 1 INJECTION, SOLUTION INTRAMUSCULAR; INTRAVENOUS; SUBCUTANEOUS at 16:34

## 2018-07-02 RX ADMIN — POTASSIUM ACETATE 1 MLS/HR: 3.93 INJECTION, SOLUTION, CONCENTRATE INTRAVENOUS at 21:59

## 2018-07-02 RX ADMIN — THIAMINE HYDROCHLORIDE 1 MLS/HR: 100 INJECTION, SOLUTION INTRAMUSCULAR; INTRAVENOUS at 17:26

## 2018-07-02 RX ADMIN — PIPERACILLIN SODIUM AND TAZOBACTAM SODIUM 1 MLS/HR: 3; .375 INJECTION, POWDER, LYOPHILIZED, FOR SOLUTION INTRAVENOUS at 21:55

## 2018-07-02 RX ADMIN — MORPHINE SULFATE 1 MG: 2 INJECTION, SOLUTION INTRAMUSCULAR; INTRAVENOUS at 19:43

## 2018-07-03 LAB
ADD MAN DIFF?: NO
ALANINE AMINOTRANSFERASE: 75 IU/L (ref 13–69)
ALBUMIN/GLOBULIN RATIO: 1.02
ALBUMIN: 3.6 G/DL (ref 3.3–4.9)
ALKALINE PHOSPHATASE: 103 IU/L (ref 42–121)
ANION GAP: 14 (ref 8–16)
ASPARTATE AMINO TRANSFERASE: 59 IU/L (ref 15–46)
BASOPHIL #: 0.1 10^3/UL (ref 0–0.1)
BASOPHILS %: 0.8 % (ref 0–2)
BILIRUBIN,DIRECT: 0 MG/DL (ref 0–0.2)
BILIRUBIN,TOTAL: 0.6 MG/DL (ref 0.2–1.3)
BLOOD UREA NITROGEN: 6 MG/DL (ref 7–20)
CALCIUM: 9.1 MG/DL (ref 8.4–10.2)
CARBON DIOXIDE: 29 MMOL/L (ref 21–31)
CHLORIDE: 106 MMOL/L (ref 97–110)
CREATININE: 0.69 MG/DL (ref 0.44–1)
EOSINOPHILS #: 0.1 10^3/UL (ref 0–0.5)
EOSINOPHILS %: 1.2 % (ref 0–7)
GLOBULIN: 3.5 G/DL (ref 1.3–3.2)
GLUCOSE: 86 MG/DL (ref 70–220)
HEMATOCRIT: 37.7 % (ref 37–47)
HEMOGLOBIN A1C: 5.2 % (ref 0–5.9)
HEMOGLOBIN: 12.7 G/DL (ref 12–16)
LYMPHOCYTES #: 1.7 10^3/UL (ref 0.8–2.9)
LYMPHOCYTES %: 15.9 % (ref 15–51)
MEAN CORPUSCULAR HEMOGLOBIN: 29.1 PG (ref 29–33)
MEAN CORPUSCULAR HGB CONC: 33.7 G/DL (ref 32–37)
MEAN CORPUSCULAR VOLUME: 86.5 FL (ref 82–101)
MEAN PLATELET VOLUME: 10.1 FL (ref 7.4–10.4)
MONOCYTE #: 0.4 10^3/UL (ref 0.3–0.9)
MONOCYTES %: 4 % (ref 0–11)
NEUTROPHIL #: 8.1 10^3/UL (ref 1.6–7.5)
NEUTROPHILS %: 77.6 % (ref 39–77)
NUCLEATED RED BLOOD CELLS #: 0 10^3/UL (ref 0–0)
NUCLEATED RED BLOOD CELLS%: 0 /100WBC (ref 0–0)
PLATELET COUNT: 311 10^3/UL (ref 140–415)
POTASSIUM: 4.4 MMOL/L (ref 3.5–5.1)
RED BLOOD COUNT: 4.36 10^6/UL (ref 4.2–5.4)
RED CELL DISTRIBUTION WIDTH: 11.4 % (ref 11.5–14.5)
SODIUM: 145 MMOL/L (ref 135–144)
TOTAL PROTEIN: 7.1 G/DL (ref 6.1–8.1)
WHITE BLOOD COUNT: 10.4 10^3/UL (ref 4.8–10.8)

## 2018-07-03 RX ADMIN — MORPHINE SULFATE 1 MG: 2 INJECTION, SOLUTION INTRAMUSCULAR; INTRAVENOUS at 17:34

## 2018-07-03 RX ADMIN — ASCORBIC ACID, VITAMIN A PALMITATE, CHOLECALCIFEROL, THIAMINE HYDROCHLORIDE, RIBOFLAVIN-5 PHOSPHATE SODIUM, PYRIDOXINE HYDROCHLORIDE, NIACINAMIDE, DEXPANTHENOL, ALPHA-TOCOPHEROL ACETATE, VITAMIN K1, FOLIC ACID, BIOTIN, CYANOCOBALAMIN 1 MLS/HR: 200; 3300; 200; 6; 3.6; 6; 40; 15; 10; 150; 600; 60; 5 INJECTION, SOLUTION INTRAVENOUS at 14:07

## 2018-07-03 RX ADMIN — MORPHINE SULFATE 1 MG: 2 INJECTION, SOLUTION INTRAMUSCULAR; INTRAVENOUS at 02:03

## 2018-07-03 RX ADMIN — MORPHINE SULFATE 1 MG: 2 INJECTION, SOLUTION INTRAMUSCULAR; INTRAVENOUS at 22:37

## 2018-07-03 RX ADMIN — POTASSIUM ACETATE 1 MLS/HR: 3.93 INJECTION, SOLUTION, CONCENTRATE INTRAVENOUS at 08:02

## 2018-07-03 RX ADMIN — PIPERACILLIN SODIUM AND TAZOBACTAM SODIUM 1 MLS/HR: 3; .375 INJECTION, POWDER, LYOPHILIZED, FOR SOLUTION INTRAVENOUS at 14:02

## 2018-07-03 RX ADMIN — PIPERACILLIN SODIUM AND TAZOBACTAM SODIUM 1 MLS/HR: 3; .375 INJECTION, POWDER, LYOPHILIZED, FOR SOLUTION INTRAVENOUS at 21:59

## 2018-07-03 RX ADMIN — PIPERACILLIN SODIUM AND TAZOBACTAM SODIUM 1 MLS/HR: 3; .375 INJECTION, POWDER, LYOPHILIZED, FOR SOLUTION INTRAVENOUS at 06:03

## 2018-07-03 RX ADMIN — PANTOPRAZOLE SODIUM 1 MG: 40 INJECTION, POWDER, FOR SOLUTION INTRAVENOUS at 06:03

## 2018-07-03 RX ADMIN — MORPHINE SULFATE 1 MG: 2 INJECTION, SOLUTION INTRAMUSCULAR; INTRAVENOUS at 08:23

## 2018-07-04 PROCEDURE — 0FB04ZX EXCISION OF LIVER, PERCUTANEOUS ENDOSCOPIC APPROACH, DIAGNOSTIC: ICD-10-PCS

## 2018-07-04 PROCEDURE — 0FT44ZZ RESECTION OF GALLBLADDER, PERCUTANEOUS ENDOSCOPIC APPROACH: ICD-10-PCS

## 2018-07-04 RX ADMIN — BUPIVACAINE HYDROCHLORIDE AND EPINEPHRINE BITARTRATE 1 ML: 2.5; .005 INJECTION, SOLUTION EPIDURAL; INFILTRATION; INTRACAUDAL; PERINEURAL at 10:56

## 2018-07-04 RX ADMIN — MORPHINE SULFATE 1 MG: 2 INJECTION, SOLUTION INTRAMUSCULAR; INTRAVENOUS at 19:59

## 2018-07-04 RX ADMIN — LIDOCAINE HYDROCHLORIDE 1 ML: 10 INJECTION, SOLUTION EPIDURAL; INFILTRATION; INTRACAUDAL; PERINEURAL at 10:56

## 2018-07-04 RX ADMIN — PIPERACILLIN SODIUM AND TAZOBACTAM SODIUM 1 MLS/HR: 3; .375 INJECTION, POWDER, LYOPHILIZED, FOR SOLUTION INTRAVENOUS at 22:07

## 2018-07-04 RX ADMIN — MORPHINE SULFATE 1 MG: 2 INJECTION, SOLUTION INTRAMUSCULAR; INTRAVENOUS at 15:37

## 2018-07-04 RX ADMIN — ASCORBIC ACID, VITAMIN A PALMITATE, CHOLECALCIFEROL, THIAMINE HYDROCHLORIDE, RIBOFLAVIN-5 PHOSPHATE SODIUM, PYRIDOXINE HYDROCHLORIDE, NIACINAMIDE, DEXPANTHENOL, ALPHA-TOCOPHEROL ACETATE, VITAMIN K1, FOLIC ACID, BIOTIN, CYANOCOBALAMIN 1 MLS/HR: 200; 3300; 200; 6; 3.6; 6; 40; 15; 10; 150; 600; 60; 5 INJECTION, SOLUTION INTRAVENOUS at 06:38

## 2018-07-04 RX ADMIN — ASCORBIC ACID, VITAMIN A PALMITATE, CHOLECALCIFEROL, THIAMINE HYDROCHLORIDE, RIBOFLAVIN-5 PHOSPHATE SODIUM, PYRIDOXINE HYDROCHLORIDE, NIACINAMIDE, DEXPANTHENOL, ALPHA-TOCOPHEROL ACETATE, VITAMIN K1, FOLIC ACID, BIOTIN, CYANOCOBALAMIN 1 MLS/HR: 200; 3300; 200; 6; 3.6; 6; 40; 15; 10; 150; 600; 60; 5 INJECTION, SOLUTION INTRAVENOUS at 09:35

## 2018-07-04 RX ADMIN — MEPERIDINE HYDROCHLORIDE 1 MG: 25 INJECTION, SOLUTION INTRAMUSCULAR; INTRAVENOUS; SUBCUTANEOUS at 13:33

## 2018-07-04 RX ADMIN — PIPERACILLIN SODIUM AND TAZOBACTAM SODIUM 1 MLS/HR: 3; .375 INJECTION, POWDER, LYOPHILIZED, FOR SOLUTION INTRAVENOUS at 05:44

## 2018-07-04 RX ADMIN — HYDROMORPHONE HYDROCHLORIDE 1 MG: 2 INJECTION INTRAMUSCULAR; INTRAVENOUS; SUBCUTANEOUS at 13:33

## 2018-07-04 RX ADMIN — PIPERACILLIN SODIUM AND TAZOBACTAM SODIUM 1 MLS/HR: 3; .375 INJECTION, POWDER, LYOPHILIZED, FOR SOLUTION INTRAVENOUS at 14:28

## 2018-07-04 RX ADMIN — MORPHINE SULFATE 1 MG: 2 INJECTION, SOLUTION INTRAMUSCULAR; INTRAVENOUS at 07:22

## 2018-07-04 RX ADMIN — PANTOPRAZOLE SODIUM 1 MG: 40 INJECTION, POWDER, FOR SOLUTION INTRAVENOUS at 05:43

## 2018-07-05 LAB
ADD MAN DIFF?: NO
ALANINE AMINOTRANSFERASE: 113 IU/L (ref 13–69)
ALBUMIN/GLOBULIN RATIO: 0.91
ALBUMIN: 3.4 G/DL (ref 3.3–4.9)
ALKALINE PHOSPHATASE: 106 IU/L (ref 42–121)
ANION GAP: 14 (ref 8–16)
ASPARTATE AMINO TRANSFERASE: 83 IU/L (ref 15–46)
BASOPHIL #: 0.1 10^3/UL (ref 0–0.1)
BASOPHILS %: 0.7 % (ref 0–2)
BILIRUBIN,DIRECT: 0 MG/DL (ref 0–0.2)
BILIRUBIN,TOTAL: 0.3 MG/DL (ref 0.2–1.3)
BLOOD UREA NITROGEN: 7 MG/DL (ref 7–20)
CALCIUM: 8.9 MG/DL (ref 8.4–10.2)
CARBON DIOXIDE: 28 MMOL/L (ref 21–31)
CHLORIDE: 105 MMOL/L (ref 97–110)
CREATININE: 0.64 MG/DL (ref 0.44–1)
EOSINOPHILS #: 0.2 10^3/UL (ref 0–0.5)
EOSINOPHILS %: 1.6 % (ref 0–7)
GLOBULIN: 3.7 G/DL (ref 1.3–3.2)
GLUCOSE: 118 MG/DL (ref 70–220)
HEMATOCRIT: 38.6 % (ref 37–47)
HEMOGLOBIN: 12.8 G/DL (ref 12–16)
LYMPHOCYTES #: 1.6 10^3/UL (ref 0.8–2.9)
LYMPHOCYTES %: 16.3 % (ref 15–51)
MEAN CORPUSCULAR HEMOGLOBIN: 29.3 PG (ref 29–33)
MEAN CORPUSCULAR HGB CONC: 33.2 G/DL (ref 32–37)
MEAN CORPUSCULAR VOLUME: 88.3 FL (ref 82–101)
MEAN PLATELET VOLUME: 9.8 FL (ref 7.4–10.4)
MONOCYTE #: 0.7 10^3/UL (ref 0.3–0.9)
MONOCYTES %: 7.8 % (ref 0–11)
NEUTROPHIL #: 7 10^3/UL (ref 1.6–7.5)
NEUTROPHILS %: 73.3 % (ref 39–77)
NUCLEATED RED BLOOD CELLS #: 0 10^3/UL (ref 0–0)
NUCLEATED RED BLOOD CELLS%: 0 /100WBC (ref 0–0)
PLATELET COUNT: 353 10^3/UL (ref 140–415)
POTASSIUM: 4.6 MMOL/L (ref 3.5–5.1)
RED BLOOD COUNT: 4.37 10^6/UL (ref 4.2–5.4)
RED CELL DISTRIBUTION WIDTH: 11.3 % (ref 11.5–14.5)
SODIUM: 142 MMOL/L (ref 135–144)
TOTAL PROTEIN: 7.1 G/DL (ref 6.1–8.1)
WHITE BLOOD COUNT: 9.5 10^3/UL (ref 4.8–10.8)

## 2018-07-05 RX ADMIN — HYDROCODONE BITARTRATE AND ACETAMINOPHEN 1 TAB: 5; 325 TABLET ORAL at 16:56

## 2018-07-05 RX ADMIN — PIPERACILLIN SODIUM AND TAZOBACTAM SODIUM 1 MLS/HR: 3; .375 INJECTION, POWDER, LYOPHILIZED, FOR SOLUTION INTRAVENOUS at 21:00

## 2018-07-05 RX ADMIN — ASCORBIC ACID, VITAMIN A PALMITATE, CHOLECALCIFEROL, THIAMINE HYDROCHLORIDE, RIBOFLAVIN-5 PHOSPHATE SODIUM, PYRIDOXINE HYDROCHLORIDE, NIACINAMIDE, DEXPANTHENOL, ALPHA-TOCOPHEROL ACETATE, VITAMIN K1, FOLIC ACID, BIOTIN, CYANOCOBALAMIN 1 MLS/HR: 200; 3300; 200; 6; 3.6; 6; 40; 15; 10; 150; 600; 60; 5 INJECTION, SOLUTION INTRAVENOUS at 10:50

## 2018-07-05 RX ADMIN — PIPERACILLIN SODIUM AND TAZOBACTAM SODIUM 1 MLS/HR: 3; .375 INJECTION, POWDER, LYOPHILIZED, FOR SOLUTION INTRAVENOUS at 05:18

## 2018-07-05 RX ADMIN — MORPHINE SULFATE 1 MG: 2 INJECTION, SOLUTION INTRAMUSCULAR; INTRAVENOUS at 00:00

## 2018-07-05 RX ADMIN — HYDROCODONE BITARTRATE AND ACETAMINOPHEN 1 TAB: 5; 325 TABLET ORAL at 20:58

## 2018-07-05 RX ADMIN — MORPHINE SULFATE 1 MG: 2 INJECTION, SOLUTION INTRAMUSCULAR; INTRAVENOUS at 03:52

## 2018-07-05 RX ADMIN — PIPERACILLIN SODIUM AND TAZOBACTAM SODIUM 1 MLS/HR: 3; .375 INJECTION, POWDER, LYOPHILIZED, FOR SOLUTION INTRAVENOUS at 14:15

## 2018-07-05 RX ADMIN — HYDROCODONE BITARTRATE AND ACETAMINOPHEN 1 TAB: 5; 325 TABLET ORAL at 10:50

## 2018-07-05 RX ADMIN — MORPHINE SULFATE 1 MG: 2 INJECTION, SOLUTION INTRAMUSCULAR; INTRAVENOUS at 08:04

## 2018-07-05 RX ADMIN — PANTOPRAZOLE SODIUM 1 MG: 40 INJECTION, POWDER, FOR SOLUTION INTRAVENOUS at 05:18

## 2018-07-06 LAB
ADD MAN DIFF?: NO
ALANINE AMINOTRANSFERASE: 113 IU/L (ref 13–69)
ALBUMIN/GLOBULIN RATIO: 0.91
ALBUMIN: 3.2 G/DL (ref 3.3–4.9)
ALKALINE PHOSPHATASE: 104 IU/L (ref 42–121)
ANION GAP: 14 (ref 8–16)
ASPARTATE AMINO TRANSFERASE: 62 IU/L (ref 15–46)
BASOPHIL #: 0.1 10^3/UL (ref 0–0.1)
BASOPHILS %: 0.7 % (ref 0–2)
BILIRUBIN,DIRECT: 0 MG/DL (ref 0–0.2)
BILIRUBIN,TOTAL: 0.3 MG/DL (ref 0.2–1.3)
BLOOD UREA NITROGEN: 8 MG/DL (ref 7–20)
CALCIUM: 8.7 MG/DL (ref 8.4–10.2)
CARBON DIOXIDE: 27 MMOL/L (ref 21–31)
CHLORIDE: 108 MMOL/L (ref 97–110)
CREATININE: 0.57 MG/DL (ref 0.44–1)
EOSINOPHILS #: 0.2 10^3/UL (ref 0–0.5)
EOSINOPHILS %: 2 % (ref 0–7)
GLOBULIN: 3.5 G/DL (ref 1.3–3.2)
GLUCOSE: 93 MG/DL (ref 70–220)
HEMATOCRIT: 35.8 % (ref 37–47)
HEMOGLOBIN: 11.9 G/DL (ref 12–16)
LYMPHOCYTES #: 1.6 10^3/UL (ref 0.8–2.9)
LYMPHOCYTES %: 17.1 % (ref 15–51)
MEAN CORPUSCULAR HEMOGLOBIN: 29.1 PG (ref 29–33)
MEAN CORPUSCULAR HGB CONC: 33.2 G/DL (ref 32–37)
MEAN CORPUSCULAR VOLUME: 87.5 FL (ref 82–101)
MEAN PLATELET VOLUME: 10.1 FL (ref 7.4–10.4)
MONOCYTE #: 0.9 10^3/UL (ref 0.3–0.9)
MONOCYTES %: 9.5 % (ref 0–11)
NEUTROPHIL #: 6.6 10^3/UL (ref 1.6–7.5)
NEUTROPHILS %: 70.4 % (ref 39–77)
NUCLEATED RED BLOOD CELLS #: 0 10^3/UL (ref 0–0)
NUCLEATED RED BLOOD CELLS%: 0 /100WBC (ref 0–0)
PLATELET COUNT: 326 10^3/UL (ref 140–415)
POTASSIUM: 3.7 MMOL/L (ref 3.5–5.1)
RED BLOOD COUNT: 4.09 10^6/UL (ref 4.2–5.4)
RED CELL DISTRIBUTION WIDTH: 11.4 % (ref 11.5–14.5)
SODIUM: 145 MMOL/L (ref 135–144)
TOTAL PROTEIN: 6.7 G/DL (ref 6.1–8.1)
WHITE BLOOD COUNT: 9.4 10^3/UL (ref 4.8–10.8)

## 2018-07-06 RX ADMIN — PIPERACILLIN SODIUM AND TAZOBACTAM SODIUM 1 MLS/HR: 3; .375 INJECTION, POWDER, LYOPHILIZED, FOR SOLUTION INTRAVENOUS at 21:55

## 2018-07-06 RX ADMIN — PIPERACILLIN SODIUM AND TAZOBACTAM SODIUM 1 MLS/HR: 3; .375 INJECTION, POWDER, LYOPHILIZED, FOR SOLUTION INTRAVENOUS at 14:40

## 2018-07-06 RX ADMIN — HYDROCODONE BITARTRATE AND ACETAMINOPHEN 1 TAB: 5; 325 TABLET ORAL at 01:56

## 2018-07-06 RX ADMIN — POTASSIUM CHLORIDE 1 MEQ: 1500 TABLET, EXTENDED RELEASE ORAL at 11:38

## 2018-07-06 RX ADMIN — PANTOPRAZOLE SODIUM 1 MG: 40 INJECTION, POWDER, FOR SOLUTION INTRAVENOUS at 06:28

## 2018-07-06 RX ADMIN — HYDROCODONE BITARTRATE AND ACETAMINOPHEN 1 TAB: 5; 325 TABLET ORAL at 15:44

## 2018-07-06 RX ADMIN — HYDROCODONE BITARTRATE AND ACETAMINOPHEN 1 TAB: 5; 325 TABLET ORAL at 21:48

## 2018-07-06 RX ADMIN — HYDROCODONE BITARTRATE AND ACETAMINOPHEN 1 TAB: 5; 325 TABLET ORAL at 11:37

## 2018-07-06 RX ADMIN — HYDROCODONE BITARTRATE AND ACETAMINOPHEN 1 TAB: 5; 325 TABLET ORAL at 06:29

## 2018-07-06 RX ADMIN — PIPERACILLIN SODIUM AND TAZOBACTAM SODIUM 1 MLS/HR: 3; .375 INJECTION, POWDER, LYOPHILIZED, FOR SOLUTION INTRAVENOUS at 06:29

## 2018-07-07 RX ADMIN — HYDROCODONE BITARTRATE AND ACETAMINOPHEN 1 TAB: 5; 325 TABLET ORAL at 15:07

## 2018-07-07 RX ADMIN — PANTOPRAZOLE SODIUM 1 MG: 40 INJECTION, POWDER, FOR SOLUTION INTRAVENOUS at 05:36

## 2018-07-07 RX ADMIN — PIPERACILLIN SODIUM AND TAZOBACTAM SODIUM 1 MLS/HR: 3; .375 INJECTION, POWDER, LYOPHILIZED, FOR SOLUTION INTRAVENOUS at 05:36

## 2018-07-07 RX ADMIN — HYDROCODONE BITARTRATE AND ACETAMINOPHEN 1 TAB: 5; 325 TABLET ORAL at 05:43

## 2018-07-07 RX ADMIN — MAGNESIUM HYDROXIDE 1 ML: 400 SUSPENSION ORAL at 13:00

## 2018-07-16 ENCOUNTER — HOSPITAL ENCOUNTER (OUTPATIENT)
Dept: HOSPITAL 91 - RAD | Age: 33
Discharge: HOME | End: 2018-07-16
Payer: COMMERCIAL

## 2018-07-16 ENCOUNTER — HOSPITAL ENCOUNTER (OUTPATIENT)
Age: 33
Discharge: HOME | End: 2018-07-16

## 2018-07-16 DIAGNOSIS — D68.59: ICD-10-CM

## 2018-07-16 DIAGNOSIS — D64.9: Primary | ICD-10-CM

## 2018-07-16 DIAGNOSIS — J18.9: ICD-10-CM

## 2018-07-16 LAB
ADD MAN DIFF?: NO
ALANINE AMINOTRANSFERASE: 58 IU/L (ref 13–69)
ALBUMIN/GLOBULIN RATIO: 1.11
ALBUMIN: 4 G/DL (ref 3.3–4.9)
ALKALINE PHOSPHATASE: 87 IU/L (ref 42–121)
ANION GAP: 16 (ref 8–16)
ASPARTATE AMINO TRANSFERASE: 27 IU/L (ref 15–46)
BASOPHIL #: 0 10^3/UL (ref 0–0.1)
BASOPHILS %: 0.4 % (ref 0–2)
BILIRUBIN,DIRECT: 0 MG/DL (ref 0–0.2)
BILIRUBIN,TOTAL: 0.2 MG/DL (ref 0.2–1.3)
BLOOD UREA NITROGEN: 11 MG/DL (ref 7–20)
CALCIUM: 9.3 MG/DL (ref 8.4–10.2)
CARBON DIOXIDE: 23 MMOL/L (ref 21–31)
CHLORIDE: 109 MMOL/L (ref 97–110)
CREATININE: 0.53 MG/DL (ref 0.44–1)
D-DIMER: 584.54 NG/ML (ref ?–460)
EOSINOPHILS #: 0.4 10^3/UL (ref 0–0.5)
EOSINOPHILS %: 4.9 % (ref 0–7)
GLOBULIN: 3.6 G/DL (ref 1.3–3.2)
GLUCOSE: 91 MG/DL (ref 70–220)
HEMATOCRIT: 40.6 % (ref 37–47)
HEMOGLOBIN: 14 G/DL (ref 12–16)
LYMPHOCYTES #: 2.4 10^3/UL (ref 0.8–2.9)
LYMPHOCYTES %: 27.3 % (ref 15–51)
MEAN CORPUSCULAR HEMOGLOBIN: 29.4 PG (ref 29–33)
MEAN CORPUSCULAR HGB CONC: 34.5 G/DL (ref 32–37)
MEAN CORPUSCULAR VOLUME: 85.3 FL (ref 82–101)
MEAN PLATELET VOLUME: 10 FL (ref 7.4–10.4)
MONOCYTE #: 0.7 10^3/UL (ref 0.3–0.9)
MONOCYTES %: 7.9 % (ref 0–11)
NEUTROPHIL #: 5.2 10^3/UL (ref 1.6–7.5)
NEUTROPHILS %: 59.1 % (ref 39–77)
NUCLEATED RED BLOOD CELLS #: 0 10^3/UL (ref 0–0)
NUCLEATED RED BLOOD CELLS%: 0 /100WBC (ref 0–0)
PLATELET COUNT: 366 10^3/UL (ref 140–415)
POTASSIUM: 4.2 MMOL/L (ref 3.5–5.1)
RED BLOOD COUNT: 4.76 10^6/UL (ref 4.2–5.4)
RED CELL DISTRIBUTION WIDTH: 11.7 % (ref 11.5–14.5)
SODIUM: 144 MMOL/L (ref 135–144)
TOTAL PROTEIN: 7.6 G/DL (ref 6.1–8.1)
WHITE BLOOD COUNT: 8.9 10^3/UL (ref 4.8–10.8)

## 2018-07-16 PROCEDURE — 80053 COMPREHEN METABOLIC PANEL: CPT

## 2018-07-16 PROCEDURE — 85025 COMPLETE CBC W/AUTO DIFF WBC: CPT

## 2018-07-16 PROCEDURE — 71046 X-RAY EXAM CHEST 2 VIEWS: CPT

## 2018-07-16 PROCEDURE — 85378 FIBRIN DEGRADE SEMIQUANT: CPT

## 2018-09-07 ENCOUNTER — HOSPITAL ENCOUNTER (OUTPATIENT)
Dept: HOSPITAL 91 - U/S | Age: 33
Discharge: HOME | End: 2018-09-07
Payer: COMMERCIAL

## 2018-09-07 ENCOUNTER — HOSPITAL ENCOUNTER (OUTPATIENT)
Age: 33
Discharge: HOME | End: 2018-09-07

## 2018-09-07 DIAGNOSIS — R10.9: Primary | ICD-10-CM

## 2018-09-07 PROCEDURE — 76700 US EXAM ABDOM COMPLETE: CPT

## 2019-02-12 ENCOUNTER — HOSPITAL ENCOUNTER (OUTPATIENT)
Dept: HOSPITAL 91 - LAB | Age: 34
Discharge: HOME | End: 2019-02-12
Payer: COMMERCIAL

## 2019-02-12 ENCOUNTER — HOSPITAL ENCOUNTER (OUTPATIENT)
Dept: HOSPITAL 10 - LAB | Age: 34
Discharge: HOME | End: 2019-02-12
Attending: INTERNAL MEDICINE
Payer: COMMERCIAL

## 2019-02-12 DIAGNOSIS — R73.03: Primary | ICD-10-CM

## 2019-02-12 DIAGNOSIS — E78.5: ICD-10-CM

## 2019-02-12 DIAGNOSIS — E03.9: ICD-10-CM

## 2019-02-12 LAB
ADD MAN DIFF?: NO
ALANINE AMINOTRANSFERASE: 18 IU/L (ref 13–69)
ALBUMIN/GLOBULIN RATIO: 1.25
ALBUMIN: 4.4 G/DL (ref 3.3–4.9)
ALKALINE PHOSPHATASE: 65 IU/L (ref 42–121)
ANION GAP: 5 (ref 5–13)
ASPARTATE AMINO TRANSFERASE: 20 IU/L (ref 15–46)
BASOPHIL #: 0.1 10^3/UL (ref 0–0.1)
BASOPHILS %: 0.8 % (ref 0–2)
BILIRUBIN,DIRECT: 0 MG/DL (ref 0–0.2)
BILIRUBIN,TOTAL: 0.1 MG/DL (ref 0.2–1.3)
BLOOD UREA NITROGEN: 11 MG/DL (ref 7–20)
CALCIUM: 9.8 MG/DL (ref 8.4–10.2)
CARBON DIOXIDE: 29 MMOL/L (ref 21–31)
CHLORIDE: 109 MMOL/L (ref 97–110)
CHOL/HDL RATIO: 2.6 RATIO
CHOLESTEROL: 139 MG/DL (ref 100–200)
CREATININE: 0.56 MG/DL (ref 0.44–1)
EOSINOPHILS #: 0.1 10^3/UL (ref 0–0.5)
EOSINOPHILS %: 1.5 % (ref 0–7)
GLOBULIN: 3.5 G/DL (ref 1.3–3.2)
GLUCOSE: 92 MG/DL (ref 70–220)
HDL CHOLESTEROL: 52 MG/DL (ref 34–82)
HEMATOCRIT: 42.8 % (ref 37–47)
HEMOGLOBIN A1C: 4.9 % (ref 0–5.9)
HEMOGLOBIN: 14.1 G/DL (ref 12–16)
IMMATURE GRANS #M: 0.05 10^3/UL (ref 0–0.03)
IMMATURE GRANS % (M): 0.5 % (ref 0–0.43)
LDL CHOLESTEROL,CALCULATED: 77 MG/DL
LYMPHOCYTES #: 2.3 10^3/UL (ref 0.8–2.9)
LYMPHOCYTES %: 24.1 % (ref 15–51)
MEAN CORPUSCULAR HEMOGLOBIN: 29 PG (ref 29–33)
MEAN CORPUSCULAR HGB CONC: 32.9 G/DL (ref 32–37)
MEAN CORPUSCULAR VOLUME: 87.9 FL (ref 82–101)
MEAN PLATELET VOLUME: 10 FL (ref 7.4–10.4)
MONOCYTE #: 0.6 10^3/UL (ref 0.3–0.9)
MONOCYTES %: 6.1 % (ref 0–11)
NEUTROPHIL #: 6.4 10^3/UL (ref 1.6–7.5)
NEUTROPHILS %: 67 % (ref 39–77)
NUCLEATED RED BLOOD CELLS #: 0 10^3/UL (ref 0–0)
NUCLEATED RED BLOOD CELLS%: 0 /100WBC (ref 0–0)
PLATELET COUNT: 314 10^3/UL (ref 140–415)
POTASSIUM: 4.3 MMOL/L (ref 3.5–5.1)
RED BLOOD COUNT: 4.87 10^6/UL (ref 4.2–5.4)
RED CELL DISTRIBUTION WIDTH: 11.4 % (ref 11.5–14.5)
SODIUM: 143 MMOL/L (ref 135–144)
T4 (THYROXINE): 10.6 UG/DL (ref 5.5–11)
THYROID STIMULATING HORMONE: 0.88 MIU/L (ref 0.47–4.68)
TOTAL PROTEIN: 7.9 G/DL (ref 6.1–8.1)
TRIGLYCERIDES: 49 MG/DL (ref 0–149)
WHITE BLOOD COUNT: 9.5 10^3/UL (ref 4.8–10.8)

## 2019-02-12 PROCEDURE — 85025 COMPLETE CBC W/AUTO DIFF WBC: CPT

## 2019-02-12 PROCEDURE — 84436 ASSAY OF TOTAL THYROXINE: CPT

## 2019-02-12 PROCEDURE — 80061 LIPID PANEL: CPT

## 2019-02-12 PROCEDURE — 84443 ASSAY THYROID STIM HORMONE: CPT

## 2019-02-12 PROCEDURE — 80053 COMPREHEN METABOLIC PANEL: CPT

## 2019-02-12 PROCEDURE — 83036 HEMOGLOBIN GLYCOSYLATED A1C: CPT
